# Patient Record
Sex: FEMALE | Race: WHITE | NOT HISPANIC OR LATINO | Employment: FULL TIME | URBAN - METROPOLITAN AREA
[De-identification: names, ages, dates, MRNs, and addresses within clinical notes are randomized per-mention and may not be internally consistent; named-entity substitution may affect disease eponyms.]

---

## 2017-04-24 RX ORDER — INTERFERON BETA-1A 30MCG/.5ML
KIT INTRAMUSCULAR
Qty: 4 KIT | Refills: 10 | Status: SHIPPED | OUTPATIENT
Start: 2017-04-24 | End: 2018-02-26 | Stop reason: SDUPTHER

## 2017-06-07 ENCOUNTER — LAB VISIT (OUTPATIENT)
Dept: LAB | Facility: HOSPITAL | Age: 52
End: 2017-06-07
Attending: NEUROMUSCULOSKELETAL MEDICINE & OMM
Payer: COMMERCIAL

## 2017-06-07 ENCOUNTER — OFFICE VISIT (OUTPATIENT)
Dept: NEUROLOGY | Facility: CLINIC | Age: 52
End: 2017-06-07
Payer: COMMERCIAL

## 2017-06-07 VITALS
WEIGHT: 118.63 LBS | BODY MASS INDEX: 19.07 KG/M2 | DIASTOLIC BLOOD PRESSURE: 82 MMHG | SYSTOLIC BLOOD PRESSURE: 106 MMHG | HEART RATE: 95 BPM | HEIGHT: 66 IN

## 2017-06-07 DIAGNOSIS — G35 MULTIPLE SCLEROSIS: ICD-10-CM

## 2017-06-07 DIAGNOSIS — G35 MS (MULTIPLE SCLEROSIS): Primary | ICD-10-CM

## 2017-06-07 DIAGNOSIS — G35 MS (MULTIPLE SCLEROSIS): ICD-10-CM

## 2017-06-07 LAB
ALBUMIN SERPL BCP-MCNC: 3.7 G/DL
ALP SERPL-CCNC: 61 U/L
ALT SERPL W/O P-5'-P-CCNC: 12 U/L
AST SERPL-CCNC: 18 U/L
BASOPHILS # BLD AUTO: 0.03 K/UL
BASOPHILS NFR BLD: 0.5 %
BILIRUB DIRECT SERPL-MCNC: 0.3 MG/DL
BILIRUB SERPL-MCNC: 0.8 MG/DL
DIFFERENTIAL METHOD: ABNORMAL
EOSINOPHIL # BLD AUTO: 0.1 K/UL
EOSINOPHIL NFR BLD: 2.3 %
ERYTHROCYTE [DISTWIDTH] IN BLOOD BY AUTOMATED COUNT: 12.9 %
HCT VFR BLD AUTO: 44.5 %
HGB BLD-MCNC: 15 G/DL
LYMPHOCYTES # BLD AUTO: 2.4 K/UL
LYMPHOCYTES NFR BLD: 42.2 %
MCH RBC QN AUTO: 31.3 PG
MCHC RBC AUTO-ENTMCNC: 33.7 %
MCV RBC AUTO: 93 FL
MONOCYTES # BLD AUTO: 0.5 K/UL
MONOCYTES NFR BLD: 8.7 %
NEUTROPHILS # BLD AUTO: 2.7 K/UL
NEUTROPHILS NFR BLD: 46.1 %
PLATELET # BLD AUTO: 336 K/UL
PMV BLD AUTO: 10.3 FL
PROT SERPL-MCNC: 7.4 G/DL
RBC # BLD AUTO: 4.8 M/UL
WBC # BLD AUTO: 5.74 K/UL

## 2017-06-07 PROCEDURE — 85025 COMPLETE CBC W/AUTO DIFF WBC: CPT

## 2017-06-07 PROCEDURE — 99999 PR PBB SHADOW E&M-EST. PATIENT-LVL II: CPT | Mod: PBBFAC,,, | Performed by: NEUROMUSCULOSKELETAL MEDICINE & OMM

## 2017-06-07 PROCEDURE — 36415 COLL VENOUS BLD VENIPUNCTURE: CPT | Mod: PO

## 2017-06-07 PROCEDURE — 80076 HEPATIC FUNCTION PANEL: CPT

## 2017-06-07 PROCEDURE — 99214 OFFICE O/P EST MOD 30 MIN: CPT | Mod: S$GLB,,, | Performed by: NEUROMUSCULOSKELETAL MEDICINE & OMM

## 2017-06-07 NOTE — PROGRESS NOTES
Progress Notes        History of present illness: Patient presents for follow-up for her multiple sclerosis.  She is doing well on Avonex with no clinical changes or complaints.  She continues to tolerate the Avonex which we have discussed as to options for changing to pills.    Previous note: 12-7-16: Patient follows up for her multiple sclerosis and migraines.. No further dizzy spells;  She has had no migraine headaches recently.  She has some muscle spasms.  She continues Avonex and does not wish to change.  We did discuss the possibility of giving off medication which she stays stable with MRIs and clinical picture, however for now she will continue the Avonex.     Previous note: 6-20-16 :Patient presents for follow-up for her multiple sclerosis. She's had no significant problems with the disease or with the medications. She continues to take the Avonex once a week and wishes to continue with that. We have discussed pill options or PLEGRITY but she does not want to change since she has done well on this medicine. Patient had an episode couple weeks ago while talking to the neighbor she felt clammy and pale and nearly passed out lasting about 1 hour overall before she felt better. She had no racing heart no shortness of breath. She has had 2 episodes like that. It is noted that she is on hormones for postmenopausal. She has also had episode of visual phenomenon probably related to migraines. She has had no further spells.     Previous note: 12-21-15: Patient presents for follow-up for her MS. She is doing well on the Avonex and does not wish to change. She is concerned about the previous dizzy spells and her family history of diabetes so we will order fasting blood sugar and hemoglobin A1c.     Cranial : right lateral gaze nystagmus noted in the right eye   Muscle strength: upper- normal lower-normal. Tone was normal   Sensory examination: Normal pinprick and touch. Vibration- decreased at toes  Deep tendon  reflexes: upper extremity-2+; lower extremity-2+ Both plantars- flexor.   Cerebellar exam upper extremities -normal with good finger to nose; gait-Normal,    Tandem slightly unsteady. Romberg's - slightly unsteady    Cervical exam: No cervical or trapezius tenderness; Lumbar Exam: Low back tenderness-negative sciatic notch tenderness-negative straight leg raising test-negative     Diagnoses: Multiple sclerosis; Near passed out spells; ; Visual aura left homonymous-Probable migraine aura; Lightheaded dizzy spell;; muscle spasms in the legs in the past; ataxic gait-improved; Elevated triglycerides; family history diabetes   Recommendations: CBC/hepatic function; long discussion again with patient changing to pills however she at this time wishes to stay with the Avonex.  Continue Avonex; follow-up 6 months I

## 2018-01-19 ENCOUNTER — OFFICE VISIT (OUTPATIENT)
Dept: URGENT CARE | Facility: CLINIC | Age: 53
End: 2018-01-19
Payer: COMMERCIAL

## 2018-01-19 VITALS
HEIGHT: 66 IN | DIASTOLIC BLOOD PRESSURE: 73 MMHG | BODY MASS INDEX: 19.13 KG/M2 | OXYGEN SATURATION: 99 % | TEMPERATURE: 100 F | HEART RATE: 89 BPM | RESPIRATION RATE: 16 BRPM | WEIGHT: 119 LBS | SYSTOLIC BLOOD PRESSURE: 117 MMHG

## 2018-01-19 DIAGNOSIS — J10.1 INFLUENZA B: Primary | ICD-10-CM

## 2018-01-19 DIAGNOSIS — R05.9 COUGH: ICD-10-CM

## 2018-01-19 LAB
CTP QC/QA: YES
FLUAV AG NPH QL: NEGATIVE
FLUBV AG NPH QL: POSITIVE

## 2018-01-19 PROCEDURE — 99214 OFFICE O/P EST MOD 30 MIN: CPT | Mod: S$GLB,,, | Performed by: NURSE PRACTITIONER

## 2018-01-19 PROCEDURE — 87804 INFLUENZA ASSAY W/OPTIC: CPT | Mod: 59,QW,S$GLB, | Performed by: NURSE PRACTITIONER

## 2018-01-19 RX ORDER — OSELTAMIVIR PHOSPHATE 75 MG/1
75 CAPSULE ORAL 2 TIMES DAILY
Qty: 10 CAPSULE | Refills: 0 | Status: SHIPPED | OUTPATIENT
Start: 2018-01-19 | End: 2018-01-24

## 2018-01-19 NOTE — PROGRESS NOTES
"Subjective:       Patient ID: Kurt Goldberg is a 52 y.o. female.    Vitals:  height is 5' 6" (1.676 m) and weight is 54 kg (119 lb). Her temperature is 99.6 °F (37.6 °C). Her blood pressure is 117/73 and her pulse is 89. Her respiration is 16 and oxygen saturation is 99%.     Chief Complaint: URI (pt has been sick for several days)    Pt said she started having bad body aches yesterday and that she did not get a flu shot.      URI    This is a new problem. The current episode started in the past 7 days. The problem has been unchanged. There has been no fever. Associated symptoms include coughing. Pertinent negatives include no abdominal pain, chest pain, congestion, ear pain, headaches, nausea, sore throat or wheezing. She has tried acetaminophen and decongestant for the symptoms. The treatment provided mild relief.     Review of Systems   Constitution: Positive for chills and malaise/fatigue. Negative for fever.   HENT: Negative for congestion, ear pain, hoarse voice and sore throat.    Eyes: Negative for discharge and redness.   Cardiovascular: Negative for chest pain, dyspnea on exertion and leg swelling.   Respiratory: Positive for cough. Negative for shortness of breath, sputum production and wheezing.    Musculoskeletal: Positive for myalgias.   Gastrointestinal: Negative for abdominal pain and nausea.   Neurological: Negative for headaches.       Objective:      Physical Exam   Constitutional: She is oriented to person, place, and time. She appears well-developed and well-nourished. She is cooperative.  Non-toxic appearance. She does not appear ill. No distress.   HENT:   Head: Normocephalic and atraumatic.   Right Ear: Hearing, tympanic membrane and ear canal normal.   Left Ear: Hearing, tympanic membrane and ear canal normal.   Nose: Mucosal edema present. No rhinorrhea or nasal deformity. No epistaxis. Right sinus exhibits no maxillary sinus tenderness and no frontal sinus tenderness. Left sinus " exhibits no maxillary sinus tenderness and no frontal sinus tenderness.   Mouth/Throat: Uvula is midline and mucous membranes are normal. No trismus in the jaw. Normal dentition. No uvula swelling. Posterior oropharyngeal erythema present.   Eyes: Conjunctivae and lids are normal. Right eye exhibits no discharge. Left eye exhibits no discharge. No scleral icterus.   Sclera clear bilat   Neck: Trachea normal, normal range of motion, full passive range of motion without pain and phonation normal. Neck supple.   Cardiovascular: Normal rate, regular rhythm and normal pulses.    Pulmonary/Chest: Effort normal. No respiratory distress.   Abdominal: Soft. Normal appearance. She exhibits no distension, no pulsatile midline mass and no mass. There is no tenderness.   Musculoskeletal: Normal range of motion. She exhibits no edema or deformity.   Neurological: She is alert and oriented to person, place, and time. She exhibits normal muscle tone. Coordination normal.   Skin: Skin is warm, dry and intact. She is not diaphoretic. No pallor.   Psychiatric: She has a normal mood and affect. Her speech is normal and behavior is normal. Judgment and thought content normal. Cognition and memory are normal.   Nursing note and vitals reviewed.      Assessment:       1. Influenza B    2. Cough        Plan:       Patient Instructions     Influenza (Adult)    Influenza is also called the flu. It is a viral illness that affects the air passages of your lungs. It is different from the common cold. The flu can easily be passed from one to person to another. It may be spread through the air by coughing and sneezing. Or it can be spread by touching the sick person and then touching your own eyes, nose, or mouth.  The flu starts 1 to 3 days after you are exposed to the flu virus. It may last for 1 to 2 weeks but many people feel tired or fatigued for many weeks afterward. You usually dont need to take antibiotics unless you have a complication.  This might be an ear or sinus infection or pneumonia.  Symptoms of the flu may be mild or severe. They can include extreme tiredness (wanting to stay in bed all day), chills, fevers, muscle aches, soreness with eye movement, headache, and a dry, hacking cough.  Home care  Follow these guidelines when caring for yourself at home:  · Avoid being around cigarette smoke, whether yours or other peoples.  · Acetaminophen or ibuprofen will help ease your fever, muscle aches, and headache. Dont give aspirin to anyone younger than 18 who has the flu. Aspirin can harm the liver.  · Nausea and loss of appetite are common with the flu. Eat light meals. Drink 6 to 8 glasses of liquids every day. Good choices are water, sport drinks, soft drinks without caffeine, juices, tea, and soup. Extra fluids will also help loosen secretions in your nose and lungs.  · Over-the-counter cold medicines will not make the flu go away faster. But the medicines may help with coughing, sore throat, and congestion in your nose and sinuses. Dont use a decongestant if you have high blood pressure.  · Stay home until your fever has been gone for at least 24 hours without using medicine to reduce fever.  Follow-up care  Follow up with your healthcare provider, or as advised, if you are not getting better over the next week.  If you are age 65 or older, talk with your provider about getting a pneumococcal vaccine every 5 years. You should also get this vaccine if you have chronic asthma or COPD. All adults should get a flu vaccine every fall. Ask your provider about this.  When to seek medical advice  Call your healthcare provider right away if any of these occur:  · Cough with lots of colored mucus (sputum) or blood in your mucus  · Chest pain, shortness of breath, wheezing, or trouble breathing  · Severe headache, or face, neck, or ear pain  · New rash with fever  · Fever of 100.4°F (38°C) or higher, or as directed by your healthcare  provider  · Confusion, behavior change, or seizure  · Severe weakness or dizziness  · You get a new fever or cough after getting better for a few days  Date Last Reviewed: 1/1/2017  © 2916-3152 [employer]. [address] [place] [address], [place] [address]. All rights reserved. This information is not intended as a substitute for professional medical care. Always follow your healthcare professional's instructions.              Influenza B    Cough  -     POCT Influenza A/B    Other orders  -     oseltamivir (TAMIFLU) 75 MG capsule; Take 1 capsule (75 mg total) by mouth 2 (two) times daily.  Dispense: 10 capsule; Refill: 0

## 2018-01-19 NOTE — PATIENT INSTRUCTIONS

## 2018-01-23 ENCOUNTER — OFFICE VISIT (OUTPATIENT)
Dept: URGENT CARE | Facility: CLINIC | Age: 53
End: 2018-01-23
Payer: COMMERCIAL

## 2018-01-23 VITALS
HEART RATE: 92 BPM | BODY MASS INDEX: 19.13 KG/M2 | DIASTOLIC BLOOD PRESSURE: 62 MMHG | RESPIRATION RATE: 18 BRPM | SYSTOLIC BLOOD PRESSURE: 96 MMHG | OXYGEN SATURATION: 99 % | TEMPERATURE: 99 F | HEIGHT: 66 IN | WEIGHT: 119 LBS

## 2018-01-23 DIAGNOSIS — J10.1 INFLUENZA B: Primary | ICD-10-CM

## 2018-01-23 DIAGNOSIS — R05.9 COUGH: ICD-10-CM

## 2018-01-23 DIAGNOSIS — R52 BODY ACHES: ICD-10-CM

## 2018-01-23 PROCEDURE — 99214 OFFICE O/P EST MOD 30 MIN: CPT | Mod: 25,S$GLB,, | Performed by: FAMILY MEDICINE

## 2018-01-23 PROCEDURE — 96372 THER/PROPH/DIAG INJ SC/IM: CPT | Mod: S$GLB,,, | Performed by: FAMILY MEDICINE

## 2018-01-23 RX ORDER — IBUPROFEN 800 MG/1
800 TABLET ORAL 3 TIMES DAILY
Qty: 30 TABLET | Refills: 0 | Status: SHIPPED | OUTPATIENT
Start: 2018-01-23 | End: 2018-02-02

## 2018-01-23 RX ORDER — CODEINE PHOSPHATE AND GUAIFENESIN 10; 100 MG/5ML; MG/5ML
5 SOLUTION ORAL 3 TIMES DAILY PRN
Qty: 120 ML | Refills: 0 | Status: SHIPPED | OUTPATIENT
Start: 2018-01-23 | End: 2018-01-30

## 2018-01-23 RX ORDER — KETOROLAC TROMETHAMINE 30 MG/ML
30 INJECTION, SOLUTION INTRAMUSCULAR; INTRAVENOUS
Status: COMPLETED | OUTPATIENT
Start: 2018-01-23 | End: 2018-01-23

## 2018-01-23 RX ADMIN — KETOROLAC TROMETHAMINE 30 MG: 30 INJECTION, SOLUTION INTRAMUSCULAR; INTRAVENOUS at 12:01

## 2018-01-23 NOTE — PROGRESS NOTES
"Subjective:       Patient ID: Kurt Goldberg is a 52 y.o. female.    Vitals:  height is 5' 6" (1.676 m) and weight is 54 kg (119 lb). Her temperature is 99 °F (37.2 °C). Her blood pressure is 96/62 and her pulse is 92. Her respiration is 18 and oxygen saturation is 99%.     Chief Complaint: Cough (Same symptoms when she was seen 1/19/2018)    Cough   This is a new problem. The current episode started in the past 7 days. The problem has been unchanged. The problem occurs every few minutes. The cough is productive of sputum. Associated symptoms include myalgias. Pertinent negatives include no chest pain, chills, ear pain, eye redness, fever, headaches, sore throat, shortness of breath or wheezing. The treatment provided no relief.     Review of Systems   Constitution: Negative for chills, fever and malaise/fatigue.   HENT: Positive for congestion. Negative for ear pain, hoarse voice and sore throat.    Eyes: Negative for discharge and redness.   Cardiovascular: Negative for chest pain, dyspnea on exertion and leg swelling.   Respiratory: Positive for cough and sputum production. Negative for shortness of breath and wheezing.    Musculoskeletal: Positive for myalgias.   Gastrointestinal: Negative for abdominal pain and nausea.   Neurological: Negative for headaches.       Objective:      Physical Exam   Constitutional: She is oriented to person, place, and time. She appears well-developed and well-nourished.   HENT:   Head: Normocephalic and atraumatic.   Right Ear: External ear normal.   Left Ear: External ear normal.   Mouth/Throat: Oropharynx is clear and moist.   Eyes: EOM are normal. Pupils are equal, round, and reactive to light.   Neck: Normal range of motion. Neck supple. No JVD present. No tracheal deviation present. No thyromegaly present.   Cardiovascular: Normal rate, regular rhythm and normal heart sounds.  Exam reveals no gallop and no friction rub.    No murmur heard.  Pulmonary/Chest: Breath " sounds normal. No respiratory distress. She has no wheezes. She has no rales. She exhibits no tenderness.   Abdominal: Soft. Bowel sounds are normal. She exhibits no distension and no mass. There is no tenderness. There is no rebound and no guarding. No hernia.   Musculoskeletal: Normal range of motion. She exhibits no edema, tenderness or deformity.   Lymphadenopathy:     She has no cervical adenopathy.   Neurological: She is alert and oriented to person, place, and time. She displays normal reflexes. No cranial nerve deficit. She exhibits normal muscle tone. Coordination normal.   Skin: Skin is warm. Capillary refill takes less than 2 seconds. No rash noted. No erythema. No pallor.   Psychiatric: She has a normal mood and affect. Her behavior is normal. Judgment and thought content normal.   Vitals reviewed.      Assessment:       1. Influenza B    2. Body aches    3. Cough        Plan:         Influenza B    Body aches  -     ketorolac injection 30 mg; Inject 30 mg into the muscle one time.  -     ibuprofen (ADVIL,MOTRIN) 800 MG tablet; Take 1 tablet (800 mg total) by mouth 3 (three) times daily.  Dispense: 30 tablet; Refill: 0    Cough  -     guaifenesin-codeine 100-10 mg/5 ml (CHERATUSSIN AC)  mg/5 mL syrup; Take 5 mLs by mouth 3 (three) times daily as needed for Cough.  Dispense: 120 mL; Refill: 0      Follow up with your doctor in a few days as needed.  Return to the urgent care or go to the ER if symptoms get worse.    Omer Wright MD

## 2018-01-23 NOTE — PATIENT INSTRUCTIONS
Influenza (Adult)    Influenza is also called the flu. It is a viral illness that affects the air passages of your lungs. It is different from the common cold. The flu can easily be passed from one to person to another. It may be spread through the air by coughing and sneezing. Or it can be spread by touching the sick person and then touching your own eyes, nose, or mouth.  The flu starts 1 to 3 days after you are exposed to the flu virus. It may last for 1 to 2 weeks but many people feel tired or fatigued for many weeks afterward. You usually dont need to take antibiotics unless you have a complication. This might be an ear or sinus infection or pneumonia.  Symptoms of the flu may be mild or severe. They can include extreme tiredness (wanting to stay in bed all day), chills, fevers, muscle aches, soreness with eye movement, headache, and a dry, hacking cough.  Home care  Follow these guidelines when caring for yourself at home:  · Avoid being around cigarette smoke, whether yours or other peoples.  · Acetaminophen or ibuprofen will help ease your fever, muscle aches, and headache. Dont give aspirin to anyone younger than 18 who has the flu. Aspirin can harm the liver.  · Nausea and loss of appetite are common with the flu. Eat light meals. Drink 6 to 8 glasses of liquids every day. Good choices are water, sport drinks, soft drinks without caffeine, juices, tea, and soup. Extra fluids will also help loosen secretions in your nose and lungs.  · Over-the-counter cold medicines will not make the flu go away faster. But the medicines may help with coughing, sore throat, and congestion in your nose and sinuses. Dont use a decongestant if you have high blood pressure.  · Stay home until your fever has been gone for at least 24 hours without using medicine to reduce fever.  Follow-up care  Follow up with your healthcare provider, or as advised, if you are not getting better over the next week.  If you are age 65 or  older, talk with your provider about getting a pneumococcal vaccine every 5 years. You should also get this vaccine if you have chronic asthma or COPD. All adults should get a flu vaccine every fall. Ask your provider about this.  When to seek medical advice  Call your healthcare provider right away if any of these occur:  · Cough with lots of colored mucus (sputum) or blood in your mucus  · Chest pain, shortness of breath, wheezing, or trouble breathing  · Severe headache, or face, neck, or ear pain  · New rash with fever  · Fever of 100.4°F (38°C) or higher, or as directed by your healthcare provider  · Confusion, behavior change, or seizure  · Severe weakness or dizziness  · You get a new fever or cough after getting better for a few days  Date Last Reviewed: 1/1/2017  © 8075-0677 [employer]. [address] [place] [address], [place] [address]. All rights reserved. This information is not intended as a substitute for professional medical care. Always follow your healthcare professional's instructions.      Follow up with your doctor in a few days as needed.  Return to the urgent care or go to the ER if symptoms get worse.    Omer Wright MD

## 2018-02-26 RX ORDER — INTERFERON BETA-1A 30MCG/.5ML
KIT INTRAMUSCULAR
Qty: 4 KIT | Refills: 10 | Status: SHIPPED | OUTPATIENT
Start: 2018-02-26 | End: 2018-12-26 | Stop reason: SDUPTHER

## 2018-12-26 RX ORDER — INTERFERON BETA-1A 30MCG/.5ML
KIT INTRAMUSCULAR
Qty: 4 ML | Refills: 10 | Status: SHIPPED | OUTPATIENT
Start: 2018-12-26 | End: 2019-11-04 | Stop reason: SDUPTHER

## 2019-11-05 RX ORDER — INTERFERON BETA-1A 30MCG/.5ML
KIT INTRAMUSCULAR
Qty: 12 ML | Refills: 11 | Status: SHIPPED | OUTPATIENT
Start: 2019-11-05 | End: 2020-10-06

## 2019-11-13 ENCOUNTER — OFFICE VISIT (OUTPATIENT)
Dept: NEUROLOGY | Facility: CLINIC | Age: 54
End: 2019-11-13
Payer: COMMERCIAL

## 2019-11-13 ENCOUNTER — LAB VISIT (OUTPATIENT)
Dept: LAB | Facility: HOSPITAL | Age: 54
End: 2019-11-13
Attending: NEUROMUSCULOSKELETAL MEDICINE & OMM
Payer: COMMERCIAL

## 2019-11-13 VITALS — BODY MASS INDEX: 18.21 KG/M2 | HEIGHT: 66 IN | WEIGHT: 113.31 LBS

## 2019-11-13 DIAGNOSIS — F32.A ANXIETY AND DEPRESSION: ICD-10-CM

## 2019-11-13 DIAGNOSIS — G35 MS (MULTIPLE SCLEROSIS): Primary | ICD-10-CM

## 2019-11-13 DIAGNOSIS — G35 MS (MULTIPLE SCLEROSIS): ICD-10-CM

## 2019-11-13 DIAGNOSIS — F41.9 ANXIETY AND DEPRESSION: ICD-10-CM

## 2019-11-13 LAB
ALBUMIN SERPL BCP-MCNC: 3.9 G/DL (ref 3.5–5.2)
ALP SERPL-CCNC: 64 U/L (ref 55–135)
ALT SERPL W/O P-5'-P-CCNC: 22 U/L (ref 10–44)
AST SERPL-CCNC: 25 U/L (ref 10–40)
BASOPHILS # BLD AUTO: 0.03 K/UL (ref 0–0.2)
BASOPHILS NFR BLD: 0.5 % (ref 0–1.9)
BILIRUB DIRECT SERPL-MCNC: 0.4 MG/DL (ref 0.1–0.3)
BILIRUB SERPL-MCNC: 1.2 MG/DL (ref 0.1–1)
DIFFERENTIAL METHOD: ABNORMAL
EOSINOPHIL # BLD AUTO: 0.1 K/UL (ref 0–0.5)
EOSINOPHIL NFR BLD: 1.5 % (ref 0–8)
ERYTHROCYTE [DISTWIDTH] IN BLOOD BY AUTOMATED COUNT: 12.5 % (ref 11.5–14.5)
HCT VFR BLD AUTO: 46.4 % (ref 37–48.5)
HGB BLD-MCNC: 15.5 G/DL (ref 12–16)
IMM GRANULOCYTES # BLD AUTO: 0.02 K/UL (ref 0–0.04)
IMM GRANULOCYTES NFR BLD AUTO: 0.3 % (ref 0–0.5)
LYMPHOCYTES # BLD AUTO: 2.3 K/UL (ref 1–4.8)
LYMPHOCYTES NFR BLD: 35.4 % (ref 18–48)
MCH RBC QN AUTO: 31.8 PG (ref 27–31)
MCHC RBC AUTO-ENTMCNC: 33.4 G/DL (ref 32–36)
MCV RBC AUTO: 95 FL (ref 82–98)
MONOCYTES # BLD AUTO: 0.5 K/UL (ref 0.3–1)
MONOCYTES NFR BLD: 7.7 % (ref 4–15)
NEUTROPHILS # BLD AUTO: 3.5 K/UL (ref 1.8–7.7)
NEUTROPHILS NFR BLD: 54.6 % (ref 38–73)
NRBC BLD-RTO: 0 /100 WBC
PLATELET # BLD AUTO: 351 K/UL (ref 150–350)
PMV BLD AUTO: 10.8 FL (ref 9.2–12.9)
PROT SERPL-MCNC: 7.7 G/DL (ref 6–8.4)
RBC # BLD AUTO: 4.87 M/UL (ref 4–5.4)
WBC # BLD AUTO: 6.49 K/UL (ref 3.9–12.7)

## 2019-11-13 PROCEDURE — 3008F PR BODY MASS INDEX (BMI) DOCUMENTED: ICD-10-PCS | Mod: CPTII,S$GLB,, | Performed by: NEUROMUSCULOSKELETAL MEDICINE & OMM

## 2019-11-13 PROCEDURE — 3008F BODY MASS INDEX DOCD: CPT | Mod: CPTII,S$GLB,, | Performed by: NEUROMUSCULOSKELETAL MEDICINE & OMM

## 2019-11-13 PROCEDURE — 99215 PR OFFICE/OUTPT VISIT, EST, LEVL V, 40-54 MIN: ICD-10-PCS | Mod: S$GLB,,, | Performed by: NEUROMUSCULOSKELETAL MEDICINE & OMM

## 2019-11-13 PROCEDURE — 85025 COMPLETE CBC W/AUTO DIFF WBC: CPT

## 2019-11-13 PROCEDURE — 36415 COLL VENOUS BLD VENIPUNCTURE: CPT | Mod: PO

## 2019-11-13 PROCEDURE — 99215 OFFICE O/P EST HI 40 MIN: CPT | Mod: S$GLB,,, | Performed by: NEUROMUSCULOSKELETAL MEDICINE & OMM

## 2019-11-13 PROCEDURE — 99999 PR PBB SHADOW E&M-EST. PATIENT-LVL II: CPT | Mod: PBBFAC,,, | Performed by: NEUROMUSCULOSKELETAL MEDICINE & OMM

## 2019-11-13 PROCEDURE — 80076 HEPATIC FUNCTION PANEL: CPT

## 2019-11-13 PROCEDURE — 99999 PR PBB SHADOW E&M-EST. PATIENT-LVL II: ICD-10-PCS | Mod: PBBFAC,,, | Performed by: NEUROMUSCULOSKELETAL MEDICINE & OMM

## 2019-11-15 PROBLEM — F32.A ANXIETY AND DEPRESSION: Status: ACTIVE | Noted: 2019-11-15

## 2019-11-15 PROBLEM — F41.9 ANXIETY AND DEPRESSION: Status: ACTIVE | Noted: 2019-11-15

## 2019-11-15 NOTE — PROGRESS NOTES
Progress Notes   Progress Notes    History of present illness:  Patient presents for follow-up for multiple sclerosis.  She has been on Avonex for over 20 years.  Recent CBC and liver function tests 6 months ago was normal.  She complains of increased stress recently due to losing her job after 30 years.  She has not had CBC and liver functions recently.  She has no clinical complaints in reference to the MS.  Her migraine headaches are stable at this time.    Previous note:  6-7-17:  Patient presents for follow-up for her multiple sclerosis. She is doing well on Avonex with no clinical changes or complaints. She continues to tolerate the Avonex which we have discussed as to options for changing to pills.   Previous note: 12-7-16: Patient follows up for her multiple sclerosis and migraines.. No further dizzy spells; She has had no migraine headaches recently. She has some muscle spasms. She continues Avonex and does not wish to change. We did discuss the possibility of giving off medication which she stays stable with MRIs and clinical picture, however for now she will continue the Avonex.   Previous note: 6-20-16 :Patient presents for follow-up for her multiple sclerosis. She's had no significant problems with the disease or with the medications. She continues to take the Avonex once a week and wishes to continue with that. We have discussed pill options or PLEGRITY but she does not want to change since she has done well on this medicine. Patient had an episode couple weeks ago while talking to the neighbor she felt clammy and pale and nearly passed out lasting about 1 hour overall before she felt better. She had no racing heart no shortness of breath. She has had 2 episodes like that. It is noted that she is on hormones for postmenopausal. She has also had episode of visual phenomenon probably related to migraines. She has had no further spells.     Cranial : right lateral gaze nystagmus noted in the right eye    Muscle strength: upper- normal lower-normal. Tone was normal   Sensory examination: Normal pinprick and touch. Vibration- decreased at toes  Deep tendon reflexes: upper extremity-2+; lower extremity-2+ Both plantars- flexor.   Cerebellar exam upper extremities -normal with good finger to nose; gait-Normal,   Tandem slightly unsteady. Romberg's - slightly unsteady   Cervical exam: No cervical or trapezius tenderness; Lumbar Exam: Low back tenderness-negative sciatic notch tenderness-negative straight leg raising test-negative     Diagnoses: Multiple sclerosis; Near passed out spells; ; Visual aura left homonymous-Probable migraine aura; Lightheaded dizzy spell;; muscle spasms in the legs in the past; ataxic gait-improved; Elevated triglycerides; family history diabetes   Recommendations: CBC/hepatic function; long discussion again with patient changing to pills however she at this time wishes to stay with the Avonex.  Particularly with her increased stress with the job loss this is not a good time to change medications however we will consider pills in the future.  Continue Avonex; follow-up 6 months I

## 2020-02-18 ENCOUNTER — OFFICE VISIT (OUTPATIENT)
Dept: NEUROLOGY | Facility: CLINIC | Age: 55
End: 2020-02-18
Payer: COMMERCIAL

## 2020-02-18 VITALS
DIASTOLIC BLOOD PRESSURE: 63 MMHG | HEART RATE: 90 BPM | BODY MASS INDEX: 17.47 KG/M2 | HEIGHT: 66 IN | WEIGHT: 108.69 LBS | SYSTOLIC BLOOD PRESSURE: 103 MMHG

## 2020-02-18 DIAGNOSIS — G35 MULTIPLE SCLEROSIS: ICD-10-CM

## 2020-02-18 DIAGNOSIS — F41.9 ANXIETY AND DEPRESSION: Primary | ICD-10-CM

## 2020-02-18 DIAGNOSIS — F32.A ANXIETY AND DEPRESSION: Primary | ICD-10-CM

## 2020-02-18 PROCEDURE — 99999 PR PBB SHADOW E&M-EST. PATIENT-LVL III: CPT | Mod: PBBFAC,,, | Performed by: NEUROMUSCULOSKELETAL MEDICINE & OMM

## 2020-02-18 PROCEDURE — 3008F BODY MASS INDEX DOCD: CPT | Mod: CPTII,S$GLB,, | Performed by: NEUROMUSCULOSKELETAL MEDICINE & OMM

## 2020-02-18 PROCEDURE — 99214 PR OFFICE/OUTPT VISIT, EST, LEVL IV, 30-39 MIN: ICD-10-PCS | Mod: S$GLB,,, | Performed by: NEUROMUSCULOSKELETAL MEDICINE & OMM

## 2020-02-18 PROCEDURE — 3008F PR BODY MASS INDEX (BMI) DOCUMENTED: ICD-10-PCS | Mod: CPTII,S$GLB,, | Performed by: NEUROMUSCULOSKELETAL MEDICINE & OMM

## 2020-02-18 PROCEDURE — 99214 OFFICE O/P EST MOD 30 MIN: CPT | Mod: S$GLB,,, | Performed by: NEUROMUSCULOSKELETAL MEDICINE & OMM

## 2020-02-18 PROCEDURE — 99999 PR PBB SHADOW E&M-EST. PATIENT-LVL III: ICD-10-PCS | Mod: PBBFAC,,, | Performed by: NEUROMUSCULOSKELETAL MEDICINE & OMM

## 2020-02-18 RX ORDER — ESTRADIOL AND NORETHINDRONE ACETATE .5; .1 MG/1; MG/1
TABLET ORAL
COMMUNITY
Start: 2020-01-27

## 2020-02-18 NOTE — PROGRESS NOTES
Progress Notes        Progress Notes   This note was dictated with Modal Fluency a word recognition program. There are occasionally word recognition errors which are missed on review.    History of present illness:  Patient presents for follow-up for her multiple sclerosis accompanied by her mother today.  Patient is extremely depressed and crying over losing her job after 30 years with the same company.  Mother also relates that she has significant financial debt partially from gambling.  Patient is looking for a job.  Her multiple sclerosis seems stable.  She wants to continue with the Avonex.  We had discussed changing medicines or stopping medicines however this is probably not a good time with the depression.  She has also do MRI scans but we will hold off at this time.  Interestingly her migraines have been quiet.    Previous note:  11-13-19:    Patient presents for follow-up for multiple sclerosis.  She has been on Avonex for over 20 years.  Recent CBC and liver function tests 6 months ago was normal.  She complains of increased stress recently due to losing her job after 30 years.  She has not had CBC and liver functions recently.  She has no clinical complaints in reference to the MS.  Her migraine headaches are stable at this time.    Cranial : right lateral gaze nystagmus noted in the right eye   Muscle strength: upper- normal lower-normal. Tone was normal   Sensory examination: Normal pinprick and touch. Vibration- decreased at toes  Deep tendon reflexes: upper extremity-2+; lower extremity-2+ Both plantars- flexor.   Cerebellar exam upper extremities -normal with good finger to nose; gait-Normal,   Tandem slightly unsteady. Romberg's - slightly unsteady   Cervical exam: No cervical or trapezius tenderness; Lumbar Exam: Low back tenderness-negative sciatic notch tenderness-negative straight leg raising test-negative     Diagnoses:  Situational depression; Multiple sclerosis; Near passed out spells; ;  Visual aura left homonymous-Probable migraine aura; Lightheaded dizzy spell;; muscle spasms in the legs in the past; ataxic gait-improved; Elevated triglycerides; family history diabetes   Recommendations:  Patient will continue to look for a job.  I have suggested she call the multiple sclerosis society and see if they have any suggestions.  Follow-up 2 months.

## 2020-04-09 ENCOUNTER — TELEPHONE (OUTPATIENT)
Dept: NEUROLOGY | Facility: CLINIC | Age: 55
End: 2020-04-09

## 2020-04-09 NOTE — TELEPHONE ENCOUNTER
----- Message from Kurt Mejia sent at 4/9/2020 10:24 AM CDT -----  Contact: Patient @ [phone]  Patient requesting a return call about needing a form completed by Dr Guerrero for her unemployment disability, pls call

## 2020-04-09 NOTE — TELEPHONE ENCOUNTER
Spoke with patient and informed her that Cesar is out of the office until Tuesday.  Advised her she can drop paperwork off to the clinic and I will place it on his desk. Informed patient that we ask she gives Cesar at least a week to complete paperwork.

## 2020-04-29 ENCOUNTER — TELEPHONE (OUTPATIENT)
Dept: NEUROLOGY | Facility: CLINIC | Age: 55
End: 2020-04-29

## 2020-04-29 NOTE — TELEPHONE ENCOUNTER
Call placed to pt. States that she wanted to check on paperwork that she dropped off to be filled out and then mailed back to her. Her income is dependent on receiving this paperwork back from the doctor. Advised that message sent to staff for update.

## 2020-04-29 NOTE — TELEPHONE ENCOUNTER
----- Message from Benji Og sent at 4/29/2020  8:21 AM CDT -----  Contact: pt @ [phone]  Pt states she left info for the doctor to complete two weeks ago, pt calling to check status

## 2020-05-26 ENCOUNTER — OFFICE VISIT (OUTPATIENT)
Dept: NEUROLOGY | Facility: CLINIC | Age: 55
End: 2020-05-26
Payer: COMMERCIAL

## 2020-05-26 VITALS
BODY MASS INDEX: 17.43 KG/M2 | DIASTOLIC BLOOD PRESSURE: 68 MMHG | HEART RATE: 83 BPM | WEIGHT: 108.44 LBS | SYSTOLIC BLOOD PRESSURE: 104 MMHG | HEIGHT: 66 IN

## 2020-05-26 DIAGNOSIS — G12.29 PSEUDOBULBAR PALSY: ICD-10-CM

## 2020-05-26 PROCEDURE — 99999 PR PBB SHADOW E&M-EST. PATIENT-LVL III: ICD-10-PCS | Mod: PBBFAC,,, | Performed by: NEUROMUSCULOSKELETAL MEDICINE & OMM

## 2020-05-26 PROCEDURE — 99214 OFFICE O/P EST MOD 30 MIN: CPT | Mod: S$GLB,,, | Performed by: NEUROMUSCULOSKELETAL MEDICINE & OMM

## 2020-05-26 PROCEDURE — 3008F PR BODY MASS INDEX (BMI) DOCUMENTED: ICD-10-PCS | Mod: CPTII,S$GLB,, | Performed by: NEUROMUSCULOSKELETAL MEDICINE & OMM

## 2020-05-26 PROCEDURE — 3008F BODY MASS INDEX DOCD: CPT | Mod: CPTII,S$GLB,, | Performed by: NEUROMUSCULOSKELETAL MEDICINE & OMM

## 2020-05-26 PROCEDURE — 99214 PR OFFICE/OUTPT VISIT, EST, LEVL IV, 30-39 MIN: ICD-10-PCS | Mod: S$GLB,,, | Performed by: NEUROMUSCULOSKELETAL MEDICINE & OMM

## 2020-05-26 PROCEDURE — 99999 PR PBB SHADOW E&M-EST. PATIENT-LVL III: CPT | Mod: PBBFAC,,, | Performed by: NEUROMUSCULOSKELETAL MEDICINE & OMM

## 2020-05-26 NOTE — PROGRESS NOTES
Progress Notes   This note was dictated with Modal Fluency a word recognition program. There are occasionally word recognition errors which are missed on review.    History of present illness:  patient presents for follow-up for multiple sclerosis.  She complains of background noise of balance problems persisting with fatigue.  She is still depressed and crying and sometimes inappropriate suggesting possibility of pseudo- bulbar palsy.  She complains of some word-finding problems particularly with stress.  She continues to take Avonex which she has been on almost 20 years.  We discussed the possibility a MRI of the brain however financially she cannot afford the 1500 dollar co-pay at this time.  Previous note:  2-18-20:  Patient presents for follow-up for her multiple sclerosis accompanied by her mother today.  Patient is extremely depressed and crying over losing her job after 30 years with the same company.  Mother also relates that she has significant financial debt partially from gambling.  Patient is looking for a job.  Her multiple sclerosis seems stable.  She wants to continue with the Avonex.  We had discussed changing medicines or stopping medicines however this is probably not a good time with the depression.  She has also do MRI scans but we will hold off at this time.  Interestingly her migraines have been quiet.     Previous note:  11-13-19:    Patient presents for follow-up for multiple sclerosis.  She has been on Avonex for over 20 years.  Recent CBC and liver function tests 6 months ago was normal.  She complains of increased stress recently due to losing her job after 30 years.  She has not had CBC and liver functions recently.  She has no clinical complaints in reference to the MS.  Her migraine headaches are stable at this time.    Cranial : right lateral gaze nystagmus noted in the right eye   Muscle strength: upper- normal lower-normal. Tone was normal   Sensory examination: Normal pinprick and  touch. Vibration- decreased at toes  Deep tendon reflexes: upper extremity-2+; lower extremity-2+ Both plantars- flexor.   Cerebellar exam upper extremities -normal with good finger to nose; gait-Normal,   Tandem slightly unsteady. Romberg's - slightly unsteady   Cervical exam: No cervical or trapezius tenderness; Lumbar Exam: Low back tenderness-negative sciatic notch tenderness-negative straight leg raising test-negative     Diagnoses:  Situational depression with possibility of pseudobulbar palsy in view of the inappropriate crying at times.; Multiple sclerosis; Near passed out spells; ; Visual aura left homonymous-Probable migraine aura; Lightheaded dizzy spell;; muscle spasms in the legs in the past; ataxic gait-improved; Elevated triglycerides; family history diabetes   Recommendations:   patient is definitely disabled due to her multiple sclerosis in multiple problems including fatigue, balance problems, pseudobulbar palsy, and depression.  Follow-up 2 months.

## 2020-08-26 ENCOUNTER — OFFICE VISIT (OUTPATIENT)
Dept: NEUROLOGY | Facility: CLINIC | Age: 55
End: 2020-08-26
Payer: COMMERCIAL

## 2020-08-26 VITALS
DIASTOLIC BLOOD PRESSURE: 73 MMHG | HEIGHT: 66 IN | SYSTOLIC BLOOD PRESSURE: 100 MMHG | WEIGHT: 99.63 LBS | HEART RATE: 103 BPM | BODY MASS INDEX: 16.01 KG/M2

## 2020-08-26 DIAGNOSIS — G35 MULTIPLE SCLEROSIS: ICD-10-CM

## 2020-08-26 DIAGNOSIS — R63.4 ABNORMAL LOSS OF WEIGHT: Primary | ICD-10-CM

## 2020-08-26 DIAGNOSIS — F41.9 ANXIETY AND DEPRESSION: ICD-10-CM

## 2020-08-26 DIAGNOSIS — G12.29 PSEUDOBULBAR PALSY: ICD-10-CM

## 2020-08-26 DIAGNOSIS — F32.A ANXIETY AND DEPRESSION: ICD-10-CM

## 2020-08-26 PROCEDURE — 99999 PR PBB SHADOW E&M-EST. PATIENT-LVL III: ICD-10-PCS | Mod: PBBFAC,,, | Performed by: NEUROMUSCULOSKELETAL MEDICINE & OMM

## 2020-08-26 PROCEDURE — 99214 PR OFFICE/OUTPT VISIT, EST, LEVL IV, 30-39 MIN: ICD-10-PCS | Mod: S$GLB,,, | Performed by: NEUROMUSCULOSKELETAL MEDICINE & OMM

## 2020-08-26 PROCEDURE — 3008F PR BODY MASS INDEX (BMI) DOCUMENTED: ICD-10-PCS | Mod: CPTII,S$GLB,, | Performed by: NEUROMUSCULOSKELETAL MEDICINE & OMM

## 2020-08-26 PROCEDURE — 99214 OFFICE O/P EST MOD 30 MIN: CPT | Mod: S$GLB,,, | Performed by: NEUROMUSCULOSKELETAL MEDICINE & OMM

## 2020-08-26 PROCEDURE — 99999 PR PBB SHADOW E&M-EST. PATIENT-LVL III: CPT | Mod: PBBFAC,,, | Performed by: NEUROMUSCULOSKELETAL MEDICINE & OMM

## 2020-08-26 PROCEDURE — 3008F BODY MASS INDEX DOCD: CPT | Mod: CPTII,S$GLB,, | Performed by: NEUROMUSCULOSKELETAL MEDICINE & OMM

## 2020-08-26 NOTE — PROGRESS NOTES
Progress Notes   This note was dictated with Modal Fluency a word recognition program. There are occasionally word recognition errors which are missed on review.    History of present illness:  Patient presents for follow-up for multiple sclerosis and anxiety disorder.  She has been doing well with some numbness of longstanding duration secondary to MS.  Her new lays problem his weight loss with considerable drop but 10 lb although she has been eating well.  She is under severe anxiety/depression due to loss of her job.  She does not have a primary care a GI doctor at this time.    Previous note:  5-26-20   patient presents for follow-up for multiple sclerosis.  She complains of background noise of balance problems persisting with fatigue.  She is still depressed and crying and sometimes inappropriate suggesting possibility of pseudo- bulbar palsy.  She complains of some word-finding problems particularly with stress.  She continues to take Avonex which she has been on almost 20 years.  We discussed the possibility a MRI of the brain however financially she cannot afford the 1500 dollar co-pay at this time.  Previous note:  2-18-20:  Patient presents for follow-up for her multiple sclerosis accompanied by her mother today.  Patient is extremely depressed and crying over losing her job after 30 years with the same company.  Mother also relates that she has significant financial debt partially from gambling.  Patient is looking for a job.  Her multiple sclerosis seems stable.  She wants to continue with the Avonex.  We had discussed changing medicines or stopping medicines however this is probably not a good time with the depression.  She has also do MRI scans but we will hold off at this time.  Interestingly her migraines have been quiet.     Previous note:  11-13-19:    Patient presents for follow-up for multiple sclerosis.  She has been on Avonex for over 20 years.  Recent CBC and liver function tests 6 months  ago was normal.  She complains of increased stress recently due to losing her job after 30 years.  She has not had CBC and liver functions recently.  She has no clinical complaints in reference to the MS.  Her migraine headaches are stable at this time.    Cranial : right lateral gaze nystagmus noted in the right eye   Muscle strength: upper- normal lower-normal. Tone was normal   Sensory examination: Normal pinprick and touch. Vibration- decreased at toes  Deep tendon reflexes: upper extremity-2+; lower extremity-2+ Both plantars- flexor.   Cerebellar exam upper extremities -normal with good finger to nose; gait-Normal,   Tandem slightly unsteady. Romberg's - slightly unsteady   Cervical exam: No cervical or trapezius tenderness; Lumbar Exam: Low back tenderness-negative sciatic notch tenderness-negative straight leg raising test-negative     Diagnoses:  Situational anxiety/ depression ; weight loss ; pseudobulbar palsy in view of the inappropriate crying at times.; Multiple sclerosis; Near passed out spells; ; Visual aura left homonymous-Probable migraine aura; Lightheaded dizzy spell;; muscle spasms in the legs in the past; ataxic gait-improved; Elevated triglycerides; family history diabetes   Recommendations:  Patient needs primary care doctor; she is referred to GI for weight loss.   patient is definitely disabled due to her multiple sclerosis in multiple problems including fatigue, balance problems, pseudobulbar palsy, and depression.  Follow-up 4 months.

## 2020-11-03 ENCOUNTER — TELEPHONE (OUTPATIENT)
Dept: NEUROLOGY | Facility: CLINIC | Age: 55
End: 2020-11-03

## 2020-11-03 NOTE — TELEPHONE ENCOUNTER
Spoke with patient and she states that [facility] to [facility] [phone]. Asked patient what was I requesting a PA for and she stated she did not know. Advised patient to find out what [employer] is and what exactly am I requesting a Prior authorization for.

## 2020-11-03 NOTE — TELEPHONE ENCOUNTER
----- Message from Laura Hudson sent at 11/3/2020 10:03 AM CST -----  Regarding: returning call  Pt states that she has a phone number to give nurse regarding her prior auth. Please give pt a call back at [phone]

## 2020-11-04 NOTE — TELEPHONE ENCOUNTER
11/3/2020 completed PA for patients Avonex. New Insurance; patient now has [place] Medicaid [employer].    Member ID:[id number]  RX BIN: [id number]  RX PCN: [id number]  RX Group: [id number]

## 2020-11-13 DIAGNOSIS — G35 MS (MULTIPLE SCLEROSIS): Primary | ICD-10-CM

## 2020-11-13 NOTE — TELEPHONE ENCOUNTER
----- Message from Ina Olsen sent at 11/13/2020 10:43 AM CST -----  Caller:   Maddi lucero/ [facility] [employer].  Tel: [phone]  /   ref.  P. A.     For medication: AVONEX Injector.   Are  you still trying to get this for the pt..   Needs a Prior Authorization.    Pls call.

## 2020-11-16 NOTE — TELEPHONE ENCOUNTER
Spoke with Arun and patients Avonex requires a PA. Need to call [facility] [employer] [place] Medicaid @  [phone].    Spoke with Debra and [facility], no PA should be needed.

## 2020-11-17 RX ORDER — INTERFERON BETA-1A 30MCG/.5ML
KIT INTRAMUSCULAR
Qty: 4 SYRINGE | Refills: 12 | Status: SHIPPED | OUTPATIENT
Start: 2020-11-17 | End: 2021-10-21 | Stop reason: SDUPTHER

## 2021-07-01 ENCOUNTER — TELEPHONE (OUTPATIENT)
Dept: NEUROLOGY | Facility: CLINIC | Age: 56
End: 2021-07-01

## 2021-07-28 ENCOUNTER — TELEPHONE (OUTPATIENT)
Dept: NEUROLOGY | Facility: CLINIC | Age: 56
End: 2021-07-28

## 2021-08-20 ENCOUNTER — OFFICE VISIT (OUTPATIENT)
Dept: NEUROLOGY | Facility: CLINIC | Age: 56
End: 2021-08-20
Payer: MEDICAID

## 2021-08-20 ENCOUNTER — TELEPHONE (OUTPATIENT)
Dept: NEUROLOGY | Facility: CLINIC | Age: 56
End: 2021-08-20

## 2021-08-20 VITALS
BODY MASS INDEX: 16.05 KG/M2 | SYSTOLIC BLOOD PRESSURE: 100 MMHG | WEIGHT: 99.44 LBS | HEART RATE: 86 BPM | DIASTOLIC BLOOD PRESSURE: 63 MMHG

## 2021-08-20 DIAGNOSIS — G35 MULTIPLE SCLEROSIS: Primary | ICD-10-CM

## 2021-08-20 DIAGNOSIS — R63.4 ABNORMAL LOSS OF WEIGHT: ICD-10-CM

## 2021-08-20 PROCEDURE — 99212 OFFICE O/P EST SF 10 MIN: CPT | Mod: PBBFAC,PO | Performed by: NEUROMUSCULOSKELETAL MEDICINE & OMM

## 2021-08-20 PROCEDURE — 99214 OFFICE O/P EST MOD 30 MIN: CPT | Mod: S$PBB,,, | Performed by: NEUROMUSCULOSKELETAL MEDICINE & OMM

## 2021-08-20 PROCEDURE — 99214 PR OFFICE/OUTPT VISIT, EST, LEVL IV, 30-39 MIN: ICD-10-PCS | Mod: S$PBB,,, | Performed by: NEUROMUSCULOSKELETAL MEDICINE & OMM

## 2021-08-20 PROCEDURE — 99999 PR PBB SHADOW E&M-EST. PATIENT-LVL II: CPT | Mod: PBBFAC,,, | Performed by: NEUROMUSCULOSKELETAL MEDICINE & OMM

## 2021-08-20 PROCEDURE — 99999 PR PBB SHADOW E&M-EST. PATIENT-LVL II: ICD-10-PCS | Mod: PBBFAC,,, | Performed by: NEUROMUSCULOSKELETAL MEDICINE & OMM

## 2021-09-03 ENCOUNTER — PATIENT MESSAGE (OUTPATIENT)
Dept: NEUROLOGY | Facility: CLINIC | Age: 56
End: 2021-09-03

## 2021-10-20 ENCOUNTER — TELEPHONE (OUTPATIENT)
Dept: NEUROLOGY | Facility: CLINIC | Age: 56
End: 2021-10-20

## 2021-10-21 DIAGNOSIS — G35 MS (MULTIPLE SCLEROSIS): ICD-10-CM

## 2021-10-21 RX ORDER — NEEDLES, DISPOSABLE 25GX5/8"
NEEDLE, DISPOSABLE MISCELLANEOUS
Qty: 4 EACH | Refills: 10 | Status: SHIPPED | OUTPATIENT
Start: 2021-10-21

## 2021-10-21 RX ORDER — INTERFERON BETA-1A 30MCG/.5ML
KIT INTRAMUSCULAR
Qty: 4 EACH | Refills: 12 | Status: SHIPPED | OUTPATIENT
Start: 2021-10-21

## 2021-11-11 ENCOUNTER — TELEPHONE (OUTPATIENT)
Dept: NEUROLOGY | Facility: CLINIC | Age: 56
End: 2021-11-11
Payer: MEDICARE

## 2021-12-21 ENCOUNTER — PATIENT MESSAGE (OUTPATIENT)
Dept: NEUROLOGY | Facility: CLINIC | Age: 56
End: 2021-12-21
Payer: MEDICARE

## 2022-02-28 ENCOUNTER — PATIENT MESSAGE (OUTPATIENT)
Dept: PSYCHIATRY | Facility: CLINIC | Age: 57
End: 2022-02-28
Payer: MEDICARE

## 2022-05-09 ENCOUNTER — TELEPHONE (OUTPATIENT)
Dept: NEUROLOGY | Facility: CLINIC | Age: 57
End: 2022-05-09
Payer: MEDICARE

## 2022-05-09 NOTE — TELEPHONE ENCOUNTER
----- Message from Kamar Hdez sent at 5/9/2022 10:14 AM CDT -----  Regarding: Pt Inquiry  Pt called and requesting a call back from the nurse in regards to her insurance.      [phone]

## 2022-05-10 NOTE — TELEPHONE ENCOUNTER
Returned call to patient who stated she just enrolled with [employer] insurance and will be Medicare.  She does not have ID number yet, but is wondering if her Avonex will be covered under new plan.  I instructed her to call me back when she obtained the ID number so I can submit a PA.  Also scheduled her a follow up appointment [phone] at 1140 per her request.

## 2022-05-20 ENCOUNTER — TELEPHONE (OUTPATIENT)
Dept: NEUROLOGY | Facility: CLINIC | Age: 57
End: 2022-05-20
Payer: MEDICARE

## 2022-05-20 NOTE — TELEPHONE ENCOUNTER
----- Message from Ange Malin sent at 5/20/2022  1:40 PM CDT -----  Regarding: Pt advise  Contact: PT @ [phone]  Pt called in asking to speak to someone in Dr. Guerrero's office. Please call pt.

## 2022-05-23 NOTE — TELEPHONE ENCOUNTER
I returned call to patient.  [employer] will go into effect on June 1st 2022.  [employer] insurance information is on file.  I informed patient that after June 1st I can submit a prior authorization for the Avenox under her [employer] insurance, and I will do this.  Patient stated she is anxious.  I assured her I would submit the prior authorization, but if she wanted to call me for her own reassurance she is welcome to do so, as I understand that changing insurances can be stressful regarding coverage.  Patient thanked me for this, and has no further questions or concerns.

## 2022-06-02 ENCOUNTER — TELEPHONE (OUTPATIENT)
Dept: NEUROLOGY | Facility: CLINIC | Age: 57
End: 2022-06-02
Payer: MEDICARE

## 2022-06-02 NOTE — TELEPHONE ENCOUNTER
----- Message from Solange Leal sent at 6/2/2022 10:48 AM CDT -----  Regarding: self  .Type: Patient Call Back    Who called: self     What is the request in detail: is requesting to speak with nurse regarding her human[employer] and med auth update.  please call     Can the clinic reply by [facility]?    Would the patient rather a call back or a response via [facility]? Call     Best call back number: .[phone]

## 2022-06-02 NOTE — TELEPHONE ENCOUNTER
----- Message from Norberto Copeland RN sent at 5/23/2022  1:50 PM CDT -----  Regarding: Avenox  On June 1st, submit PA for Avenox under [employer] insurance.

## 2022-06-02 NOTE — TELEPHONE ENCOUNTER
PA initiated through CoverMyMeds for Avonex 4 syringe / 28 days.  Key: [id number]    I called patient to inform her.

## 2022-06-03 NOTE — TELEPHONE ENCOUNTER
PA denied.  Message left for patient.  Dr. Guerrero routed this message asking if he wanted to try a formulary medication or appeal.

## 2022-06-07 ENCOUNTER — TELEPHONE (OUTPATIENT)
Dept: NEUROLOGY | Facility: CLINIC | Age: 57
End: 2022-06-07
Payer: MEDICARE

## 2022-06-07 NOTE — TELEPHONE ENCOUNTER
----- Message from Domingo Green sent at 6/7/2022 11:08 AM CDT -----  Contact: pt.  Type:  Needs Medical Advice    Who Called: pt  Would the patient rather a call back or a response via [facility]? Call back  Best Call Back Number: [phone]   Additional Information: Pt. Is requesting a call back office.

## 2022-06-07 NOTE — TELEPHONE ENCOUNTER
----- Message from Lizette Parham sent at 6/7/2022 10:10 AM CDT -----  Name of Who is Calling: SRI BRUCE         What is the request in detail:The patient is calling to speak to the nurse in regards to giving some information.please advise          Can the clinic reply by [facility]:No         What Number [facility] Back [facility]: [phone]

## 2022-06-07 NOTE — TELEPHONE ENCOUNTER
Returned call to patient left voicemail informing her that she can call me back at clinic or through [facility].

## 2022-06-07 NOTE — TELEPHONE ENCOUNTER
Returned call to patient.  She is wondering if Dr. Guerrero can do a Peer to Peer.  She provided 2 numbers.  [phone] and [phone], both for [facility].  I told her I would reach out to [facility] tomorrow to see if a Peer to Peer is possible, and call her afterwards to inform.

## 2022-06-08 ENCOUNTER — OFFICE VISIT (OUTPATIENT)
Dept: NEUROLOGY | Facility: CLINIC | Age: 57
End: 2022-06-08
Payer: MEDICARE

## 2022-06-08 VITALS
DIASTOLIC BLOOD PRESSURE: 69 MMHG | BODY MASS INDEX: 16.9 KG/M2 | HEART RATE: 100 BPM | WEIGHT: 105.19 LBS | SYSTOLIC BLOOD PRESSURE: 114 MMHG | HEIGHT: 66 IN

## 2022-06-08 DIAGNOSIS — R63.4 ABNORMAL LOSS OF WEIGHT: ICD-10-CM

## 2022-06-08 DIAGNOSIS — G35 MULTIPLE SCLEROSIS: Primary | ICD-10-CM

## 2022-06-08 DIAGNOSIS — R26.81 UNSTEADY GAIT: ICD-10-CM

## 2022-06-08 DIAGNOSIS — G12.29 PSEUDOBULBAR PALSY: ICD-10-CM

## 2022-06-08 PROCEDURE — 99999 PR PBB SHADOW E&M-EST. PATIENT-LVL II: CPT | Mod: PBBFAC,,, | Performed by: NEUROMUSCULOSKELETAL MEDICINE & OMM

## 2022-06-08 PROCEDURE — 3078F PR MOST RECENT DIASTOLIC BLOOD PRESSURE < 80 MM HG: ICD-10-PCS | Mod: CPTII,S$GLB,, | Performed by: NEUROMUSCULOSKELETAL MEDICINE & OMM

## 2022-06-08 PROCEDURE — 99215 PR OFFICE/OUTPT VISIT, EST, LEVL V, 40-54 MIN: ICD-10-PCS | Mod: S$GLB,,, | Performed by: NEUROMUSCULOSKELETAL MEDICINE & OMM

## 2022-06-08 PROCEDURE — 1159F MED LIST DOCD IN RCRD: CPT | Mod: CPTII,S$GLB,, | Performed by: NEUROMUSCULOSKELETAL MEDICINE & OMM

## 2022-06-08 PROCEDURE — 3074F SYST BP LT 130 MM HG: CPT | Mod: CPTII,S$GLB,, | Performed by: NEUROMUSCULOSKELETAL MEDICINE & OMM

## 2022-06-08 PROCEDURE — 3008F PR BODY MASS INDEX (BMI) DOCUMENTED: ICD-10-PCS | Mod: CPTII,S$GLB,, | Performed by: NEUROMUSCULOSKELETAL MEDICINE & OMM

## 2022-06-08 PROCEDURE — 3074F PR MOST RECENT SYSTOLIC BLOOD PRESSURE < 130 MM HG: ICD-10-PCS | Mod: CPTII,S$GLB,, | Performed by: NEUROMUSCULOSKELETAL MEDICINE & OMM

## 2022-06-08 PROCEDURE — 1159F PR MEDICATION LIST DOCUMENTED IN MEDICAL RECORD: ICD-10-PCS | Mod: CPTII,S$GLB,, | Performed by: NEUROMUSCULOSKELETAL MEDICINE & OMM

## 2022-06-08 PROCEDURE — 99215 OFFICE O/P EST HI 40 MIN: CPT | Mod: S$GLB,,, | Performed by: NEUROMUSCULOSKELETAL MEDICINE & OMM

## 2022-06-08 PROCEDURE — 3008F BODY MASS INDEX DOCD: CPT | Mod: CPTII,S$GLB,, | Performed by: NEUROMUSCULOSKELETAL MEDICINE & OMM

## 2022-06-08 PROCEDURE — 99999 PR PBB SHADOW E&M-EST. PATIENT-LVL II: ICD-10-PCS | Mod: PBBFAC,,, | Performed by: NEUROMUSCULOSKELETAL MEDICINE & OMM

## 2022-06-08 PROCEDURE — 3078F DIAST BP <80 MM HG: CPT | Mod: CPTII,S$GLB,, | Performed by: NEUROMUSCULOSKELETAL MEDICINE & OMM

## 2022-06-08 NOTE — PROGRESS NOTES
Progress Notes   This note was dictated with Modal Fluency a word recognition program. There are occasionally word recognition errors which are missed on review.    History of present illness:  She continues to have poor weight gain.  She has hyperesthesia over the upper body.  She has continued balance problems with very unsteady gait particularly with no cane today long discussion in reference to the need for persistent Avonex.  She has gone over 10 years with no clinical exacerbations in no new lesions.  My suggestion is to stop the Avonex and see if this is the source of her weight loss.  It is unlikely at her age and stable clinical and MRI scans that she would develop any further problems.  Her balance problem is persistent.      Previous note:  8-20-21   patient presents for follow-up for her multiple sclerosis.  She has had no major changes.  She continues ejected Avonex injections once the VIII week clear.  Patient has not been able to work since 2019 due to multiple symptoms related to the MS she is applying for disability and needs a letter.  The  Previous note:  8-26-20:   Patient presents for follow-up for multiple sclerosis and anxiety disorder.  She has been doing well with some numbness of longstanding duration secondary to MS.  Her new lays problem his weight loss with considerable drop but 10 lb although she has been eating well.  She is under severe anxiety/depression due to loss of her job.  She does not have a primary care a GI doctor at this time.     Previous note:  5-26-20   patient presents for follow-up for multiple sclerosis.  She complains of background noise of balance problems persisting with fatigue.  She is still depressed and crying and sometimes inappropriate suggesting possibility of pseudo- bulbar palsy.  She complains of some word-finding problems particularly with stress.  She continues to take Avonex which she has been on almost 20 years.  We discussed the possibility a MRI  of the brain however financially she cannot afford the 1500 dollar co-pay at this time.  Previous note:  2-18-20:  Patient presents for follow-up for her multiple sclerosis accompanied by her mother today.  Patient is extremely depressed and crying over losing her job after 30 years with the same company.  Mother also relates that she has significant financial debt partially from gambling.  Patient is looking for a job.  Her multiple sclerosis seems stable.  She wants to continue with the Avonex.  We had discussed changing medicines or stopping medicines however this is probably not a good time with the depression.  She has also do MRI scans but we will hold off at this time.  Interestingly her migraines have been quiet.     Neurologic exam:    Cranial : right lateral gaze nystagmus noted in the right eye   Muscle strength: upper- normal lower-normal. Tone was normal   Sensory examination: Normal pinprick and touch. Vibration- decreased at toes  Deep tendon reflexes: upper extremity-2+; lower extremity-2+ Both plantars- flexor.   Cerebellar exam upper extremities -normal with good finger to nose; gait-Normal,   Tandem slightly unsteady. Romberg's - slightly unsteady   Cervical exam: No cervical or trapezius tenderness; Lumbar Exam: Low back tenderness-negative sciatic notch tenderness-negative straight leg raising test-negative     Diagnoses:  Situational anxiety/ depression ; weight loss ; pseudobulbar palsy in view of the inappropriate crying at times.; Multiple sclerosis; Near passed out spells; ; Visual aura left homonymous-Probable migraine aura; Lightheaded dizzy spell;; muscle spasms in the legs in the past; ataxic gait-improved; Elevated triglycerides; family history diabetes   Recommendations:   long discussion with the patient in reference to her present symptoms.  She has poor gait with unsteadiness.  She has some hyperesthesia over the upper body.  She continues to have poor weight gain  ; patient is   disabled due to her multiple sclerosis in multiple problems including fatigue, balance problems, pseudobulbar palsy, and depression.  My suggestion is to stop the Avonex at the present time to see if this is the source of her weight loss.  Follow-up 6 months.

## 2022-06-08 NOTE — TELEPHONE ENCOUNTER
I called [facility], and I was informed that [employer] does not have a Peer to Peer option.  Only option is to change Avonex to a formulary medication or to appeal.  I placed a phone call to patient, she would like to see Dr. Guerrero to discuss a plan of action for her MS due to Avenox not being covered by her insurance.  He has an opening scheduled for today at 3:40pm.  Appointment scheduled.  Patient thanked me for call and had no other needs at this time.

## 2022-06-24 ENCOUNTER — PATIENT MESSAGE (OUTPATIENT)
Dept: PSYCHIATRY | Facility: CLINIC | Age: 57
End: 2022-06-24
Payer: MEDICARE

## 2022-12-01 ENCOUNTER — PATIENT MESSAGE (OUTPATIENT)
Dept: PSYCHIATRY | Facility: CLINIC | Age: 57
End: 2022-12-01
Payer: MEDICARE

## 2023-02-20 ENCOUNTER — PATIENT MESSAGE (OUTPATIENT)
Dept: PSYCHIATRY | Facility: CLINIC | Age: 58
End: 2023-02-20
Payer: MEDICARE

## 2023-07-21 ENCOUNTER — PATIENT MESSAGE (OUTPATIENT)
Dept: PSYCHIATRY | Facility: CLINIC | Age: 58
End: 2023-07-21
Payer: MEDICARE

## 2024-01-22 ENCOUNTER — PATIENT MESSAGE (OUTPATIENT)
Dept: PSYCHIATRY | Facility: CLINIC | Age: 59
End: 2024-01-22
Payer: MEDICARE

## 2024-05-02 ENCOUNTER — PATIENT MESSAGE (OUTPATIENT)
Dept: PSYCHIATRY | Facility: CLINIC | Age: 59
End: 2024-05-02
Payer: MEDICARE

## 2024-07-25 ENCOUNTER — PATIENT MESSAGE (OUTPATIENT)
Dept: PSYCHIATRY | Facility: CLINIC | Age: 59
End: 2024-07-25
Payer: MEDICARE

## 2024-08-05 ENCOUNTER — PATIENT MESSAGE (OUTPATIENT)
Dept: PSYCHIATRY | Facility: CLINIC | Age: 59
End: 2024-08-05
Payer: MEDICARE

## 2024-12-16 ENCOUNTER — PATIENT MESSAGE (OUTPATIENT)
Dept: PSYCHIATRY | Facility: CLINIC | Age: 59
End: 2024-12-16
Payer: MEDICARE

## 2025-04-15 ENCOUNTER — OFFICE VISIT (OUTPATIENT)
Dept: URGENT CARE | Facility: CLINIC | Age: 60
End: 2025-04-15
Payer: MEDICARE

## 2025-04-15 VITALS
BODY MASS INDEX: 16.88 KG/M2 | HEART RATE: 95 BPM | WEIGHT: 105 LBS | DIASTOLIC BLOOD PRESSURE: 70 MMHG | HEIGHT: 66 IN | RESPIRATION RATE: 18 BRPM | TEMPERATURE: 98 F | SYSTOLIC BLOOD PRESSURE: 106 MMHG | OXYGEN SATURATION: 97 %

## 2025-04-15 DIAGNOSIS — S60.10XA SUBUNGUAL HEMATOMA OF FINGER, INITIAL ENCOUNTER: ICD-10-CM

## 2025-04-15 DIAGNOSIS — S62.501A CLOSED AVULSION FRACTURE OF PHALANX OF RIGHT THUMB, INITIAL ENCOUNTER: Primary | ICD-10-CM

## 2025-04-15 DIAGNOSIS — M79.644 THUMB PAIN, RIGHT: ICD-10-CM

## 2025-04-15 PROCEDURE — 11740 EVACUATION SUBUNGUAL HMTMA: CPT | Mod: F5,S$GLB,,

## 2025-04-15 PROCEDURE — 99203 OFFICE O/P NEW LOW 30 MIN: CPT | Mod: 25,S$GLB,,

## 2025-04-15 RX ORDER — IBUPROFEN 800 MG/1
800 TABLET ORAL 3 TIMES DAILY
Qty: 21 TABLET | Refills: 0 | Status: SHIPPED | OUTPATIENT
Start: 2025-04-15 | End: 2025-04-22

## 2025-04-15 NOTE — PATIENT INSTRUCTIONS
A referral for Hand has been placed. Call [phone] to schedule an appointment. Make sure to follow up in 1-2 weeks or sooner if symptoms continue or worsen.     - Epsom salt soaks soaks to the finger 2 times per day for 1-2 days for 10 minutes at a time.   - Keep splint on at all times.     - Rest.    - Drink plenty of fluids.    Take tylenol and ibuprofen as needed for pain. You can alternate between the 2 every 4 hours. For example: take 600 mg of ibuprofen/advil and then 4 hours later you can take 1000 mg of tylenol/acetaminophen. Repeat this every 4 hours as needed for pain.     - You must understand that you have received an Urgent Care treatment only and that you may be released before all of your medical problems are known or treated.   - You, the patient, will arrange for follow up care as instructed.   - If your condition worsens or fails to improve we recommend that you receive another evaluation at the ER immediately or contact your PCP to discuss your concerns or return here.   - Follow up with your PCP or specialty clinic as directed in the next 1-2 weeks if not improved or as needed.  You can call [phone] to schedule an appointment with the appropriate provider.    If your symptoms do not improve or worsen, go to the emergency room immediately.

## 2025-04-15 NOTE — PROCEDURES
Incision & Drainage    Date/Time: 4/15/2025 12:15 PM    Performed by: Ruma Dover PA-C  Authorized by: Ruma Dover PA-C      Type:  Subungual hematoma  Body area:  Upper extremity  Location details:  Right thumb  Scalpel size: Bovie.  Incision type:  Single straight  Complexity:  Simple  Drainage:  Bloody  Drainage amount:  Moderate  Wound treatment:  Drainage  Patient tolerance:  Patient tolerated the procedure well with no immediate complications

## 2025-04-15 NOTE — PROGRESS NOTES
"Subjective:      Patient ID: Kurt Goldberg is a 59 y.o. female.    Vitals:  height is 5' 6" (1.676 m) and weight is 47.6 kg (105 lb). Her oral temperature is 97.9 °F (36.6 °C). Her blood pressure is 106/70 and her pulse is 95. Her respiration is 18 and oxygen saturation is 97%.     Chief Complaint: Hand Pain    Pt states that she smashed her right thumb in car door on 4/12. Pt states that she is having pain level at 10 and hurts with movement and contact . Pt has tried ice and advil     Hand Pain   Her dominant hand is their right hand. The incident occurred 3 to 5 days ago. The incident occurred at home. The injury mechanism was a direct blow. The pain is present in the right fingers. The quality of the pain is described as aching, shooting and stabbing. The pain does not radiate. The pain is at a severity of 10/10. The pain is severe. The pain has been Constant since the incident. Associated symptoms include tingling. She has tried ice and NSAIDs for the symptoms. The treatment provided no relief.       Musculoskeletal:  Positive for pain, trauma, joint pain and joint swelling.   Skin:  Negative for erythema.      Objective:     Physical Exam   Constitutional: She is oriented to person, place, and time.  Non-toxic appearance. She does not appear ill. No distress.      Comments:Patient sits comfortably in exam chair. Answers questions in complete sentences. Does not show any signs of distress or discoloration.        HENT:   Head: Normocephalic and atraumatic.   Ears:   Right Ear: External ear normal.   Left Ear: External ear normal.   Nose: Nose normal.   Eyes: Pupils are equal, round, and reactive to light. Extraocular movement intact   Pulmonary/Chest: Effort normal. No respiratory distress.   Abdominal: Normal appearance.   Musculoskeletal:         General: Swelling, tenderness and signs of injury present. No deformity.        Hands:       Right lower leg: No edema.      Left lower leg: No edema. "   Neurological: no focal deficit. She is alert and oriented to person, place, and time.   Skin: Skin is warm, dry, not diaphoretic, not pale and no rash. Capillary refill takes less than 2 seconds. No bruising, No erythema and No lesion no jaundice  Psychiatric: Her behavior is normal. Mood, judgment and thought content normal.     XR FINGER 2 OR MORE VIEWS  Result Date: 4/15/2025  EXAMINATION: XR FINGER 2 OR MORE VIEWS CLINICAL HISTORY: right thumb smash in door;  Pain in right finger(s) TECHNIQUE: Three views of the right thumb were obtained. COMPARISON: None FINDINGS: The bones are intact.  There is no evidence for acute fracture or bone destruction.  There is no evidence for dislocation.  No bony erosions are identified.  There is soft tissue swelling of the distal thumb.  No radiopaque soft tissue foreign bodies are identified.     No evidence for acute fracture, bone destruction, or dislocation. Electronically signed by: Bryon Zaragoza MD Date:    04/15/2025 Time:    13:33      Assessment:     1. Closed avulsion fracture of phalanx of right thumb, initial encounter    2. Thumb pain, right    3. Subungual hematoma of finger, initial encounter        Plan:       Closed avulsion fracture of phalanx of right thumb, initial encounter  -     SPLINT FOR HOME USE  -     Ambulatory referral/consult to Hand Surgery  -     ibuprofen (ADVIL,MOTRIN) 800 MG tablet; Take 1 tablet (800 mg total) by mouth 3 (three) times daily. for 7 days  Dispense: 21 tablet; Refill: 0    Thumb pain, right  -     XR FINGER 2 OR MORE VIEWS; Future; Expected date: 04/15/2025    Subungual hematoma of finger, initial encounter  -     Incision & Drainage                  Patient Instructions   A referral for Hand has been placed. Call [phone] to schedule an appointment. Make sure to follow up in 1-2 weeks or sooner if symptoms continue or worsen.     - Epsom salt soaks soaks to the finger 2 times per day for 1-2 days for 10 minutes at a time.    - Keep splint on at all times.     - Rest.    - Drink plenty of fluids.    Take tylenol and ibuprofen as needed for pain. You can alternate between the 2 every 4 hours. For example: take 600 mg of ibuprofen/advil and then 4 hours later you can take 1000 mg of tylenol/acetaminophen. Repeat this every 4 hours as needed for pain.     - You must understand that you have received an Urgent Care treatment only and that you may be released before all of your medical problems are known or treated.   - You, the patient, will arrange for follow up care as instructed.   - If your condition worsens or fails to improve we recommend that you receive another evaluation at the ER immediately or contact your PCP to discuss your concerns or return here.   - Follow up with your PCP or specialty clinic as directed in the next 1-2 weeks if not improved or as needed.  You can call [phone] to schedule an appointment with the appropriate provider.    If your symptoms do not improve or worsen, go to the emergency room immediately.